# Patient Record
Sex: FEMALE | URBAN - METROPOLITAN AREA
[De-identification: names, ages, dates, MRNs, and addresses within clinical notes are randomized per-mention and may not be internally consistent; named-entity substitution may affect disease eponyms.]

---

## 2024-08-01 ENCOUNTER — TELEPHONE (OUTPATIENT)
Dept: PRIMARY CARE CLINIC | Facility: CLINIC | Age: 79
End: 2024-08-01

## 2024-08-01 NOTE — TELEPHONE ENCOUNTER
Received a return call from Ms. Emi Lovingews S/P discharge from hospital ([facility]), currently at [facility] for 2 weeks or more. Speech is improved from last time we spoke the week of 07/24. Pt states she is receiving OT, PT, ST and has noticed the improvement, in her speech over the last week. Shares that she knew what she wanted to say before but couldn't find the words, speech now is more fluent with an occasional stutter. Pt seemed real pleased that she received a personal call from Dr. Boswell.

## 2024-11-07 ENCOUNTER — TELEPHONE (OUTPATIENT)
Dept: PRIMARY CARE CLINIC | Facility: CLINIC | Age: 79
End: 2024-11-07

## 2024-11-18 ENCOUNTER — TELEPHONE (OUTPATIENT)
Dept: PRIMARY CARE CLINIC | Facility: CLINIC | Age: 79
End: 2024-11-18

## 2024-12-03 ENCOUNTER — TELEPHONE (OUTPATIENT)
Dept: PRIMARY CARE CLINIC | Facility: CLINIC | Age: 79
End: 2024-12-03